# Patient Record
(demographics unavailable — no encounter records)

---

## 2024-10-11 NOTE — PROCEDURE
[FreeTextEntry3] : Procedural Report Name: BRENDA MIN   MRN: [id number]  : Mar 31 1959  Requesting MD: CLAIR MOSLEY  Exam:  EVLT - Laser DOS: 10/11/2024  History: 65 year old F with insufficiency of left great saphenous vein referred for endovenous laser ablation of the saphenous vein.  Anesthesia:  local only  Procedure time: 1hour  Procedure and Findings:  Informed consent was obtained from the patient prior to this procedure.  Continuous physiological monitoring of the patients vital signs was performed throughout the procedure.  The patient was brought into the procedure suite.  The patient was then placed supine on the procedure table and the leg prepped and draped in the usual sterile fashion.  The saphenous vein was accessed in the thigh under ultrasound guidance using a 21 gauge micropuncture needle.  The needle was exchanged over a 0.018 inch guide wire for a 4 Bulgarian tapered dilator.  After exchanging for a 0.035 inch guide wire, a five Bulgarian 45 cm long sheath was advanced to the saphenofemoral junction.  An [employer] laser fiber was then advanced through the sheath to the saphenofemoral junction.  Tumescent anesthesia using a .25% lidocaine solution was administered along the entire course of the vein under ultrasound guidance.  Compression of the vein was noted throughout its course.  After re-determining that the tip of the fiber was below the saphenofemoral junction, the laser was activated to 8 Ge energy and slowly withdrawn wit the sheath.  Total pullback time was approximately 139  seconds.  Total energy delivered was 1115 J.  Total vein length treated was 29 cm.  The laser was deactivated approximately 2 cm proximal to the puncture site.  The sheath and fiber was then removed.  Repeat ultrasound exam demonstrate no flow within the saphenous vein.  The common femoral vein remains patent.  A grade 2 thigh high compression stocking was then applied.  The patient tolerated the procedure without incident.  Impression:  Successful endovenous laser ablation of left greater saphenous vein as described above.

## 2024-10-24 NOTE — DATA REVIEWED
[FreeTextEntry1] : I performed a venous duplex which was medically necessary to evaluate for GSV closure. It showed no evidence of DVT and L GSV is closed.

## 2024-10-24 NOTE — ASSESSMENT
[FreeTextEntry1] : 64 y/o F with symptomatic left lower extremity varicose veins and left GSV was noted to be incompetent and positive for reflux. She underwent ablation of GSV and is very happy with the results as the varicosities have regressed.  Venous duplex showed no evidence of DVT and L GSV is closed.  Advised use of compression stockings and f/u as needed.    I, Dr. Micah Castellanos, personally performed the evaluation and management (E/M) services for this established patient who presents today with (a) new problem(s)/exacerbation of (an) existing condition(s). That E/M includes conducting the clinically appropriate interval history &/or exam, assessing all new/exacerbated conditions, and establishing a new plan of care. Today, my RUBÉN, Hafsa Reina PA-C, was here to observe my evaluation and management service for this new problem/exacerbated condition and follow the plan of care established by me going forward.

## 2024-10-24 NOTE — HISTORY OF PRESENT ILLNESS
[FreeTextEntry1] : 64 y/o F with symptomatic left lower extremity varicose veins and left GSV was noted to be incompetent and positive for reflux.  She underwent ablation of GSV and is very happy with the results.